# Patient Record
Sex: MALE | Race: ASIAN | NOT HISPANIC OR LATINO
[De-identification: names, ages, dates, MRNs, and addresses within clinical notes are randomized per-mention and may not be internally consistent; named-entity substitution may affect disease eponyms.]

---

## 2022-01-27 PROBLEM — Z00.00 ENCOUNTER FOR PREVENTIVE HEALTH EXAMINATION: Status: ACTIVE | Noted: 2022-01-27

## 2022-02-07 ENCOUNTER — APPOINTMENT (OUTPATIENT)
Dept: ORTHOPEDIC SURGERY | Facility: CLINIC | Age: 62
End: 2022-02-07
Payer: COMMERCIAL

## 2022-02-07 VITALS — HEIGHT: 67 IN | WEIGHT: 196 LBS | BODY MASS INDEX: 30.76 KG/M2

## 2022-02-07 PROCEDURE — 73030 X-RAY EXAM OF SHOULDER: CPT | Mod: RT

## 2022-02-07 PROCEDURE — 99204 OFFICE O/P NEW MOD 45 MIN: CPT | Mod: 25

## 2022-02-07 PROCEDURE — 20610 DRAIN/INJ JOINT/BURSA W/O US: CPT | Mod: RT

## 2022-02-07 RX ORDER — NABUMETONE 500 MG/1
500 TABLET, FILM COATED ORAL
Qty: 60 | Refills: 2 | Status: ACTIVE | COMMUNITY
Start: 2022-02-07 | End: 1900-01-01

## 2022-02-07 NOTE — HISTORY OF PRESENT ILLNESS
[de-identified] : Location: Right shoulder- lateral\par Duration: 4-5 weeks\par Context: atraumatic\par Quality: sharp, achy, dull\par Aggravating factors: ROM\par Associated symptoms: N/A\par Conservative treatment: Tylenol, ice\par Prior studies: N/A

## 2022-02-07 NOTE — PHYSICAL EXAM
[de-identified] : Right Shoulder:\par Constitutional:\par The patient is healthy-appearing and in no apparent distress. \par \par Cardiovascular System: \par The capillary refill is less than 2 seconds. \par \par Skin: \par There are no skin abnormalities.\par \par C-Spine/Neck:\par \par Active Range of Motion:\par Flexion				50\par Extension			60\par Lateral rotation			80  \par \par Right Shoulder: \par Inspection: \par There is no atrophy, erythema, warmth, swelling.\par There is no scapular winging.\par There is no AC prominence. \par \par Bony Palpation: \par There is no tenderness of the clavicle.\par There is no tenderness of the acromioclavicular joint.\par There is no tenderness of the greater tuberosity. \par There is no tenderness of the bicipital groove.\par  \par Soft Tissue Palpation: \par There is no tenderness of the trapezius.\par There is no tenderness of the rhomboid.\par There is no tenderness of the subacromial bursa. \par \par Active Range of Motion: \par Forward flexion- 				180 \par Abduction-					120\par External rotation at 0 degrees abduction-	80 \par Internal rotation at 0 degrees abduction-	80\par \par Passive Range of Motion: \par Forward flexion- 			180 \par Abduction-				120\par External rotation at 0 deg abduction-	80 \par Internal rotation at 0 deg abduction-	80\par \par Special Tests: \par Hawkin's  				Positive \par Neer's  				Positive \par Speed's  				Negative\par AC cross-over 			            Negative\par [place]'s  				Negative\par \par Stability: \par There is no general laxity. \par There is no anterior apprehension.\par \par Strength: \par Supraspinatus abduction 		5/5\par External rotation at 0 deg abduction 	5/5\par Internal rotation at 0 deg abduction	5/5\par Scapular elevation 			5/5 \par \par Neurological System: \par \par There is normal sensation to light touch C5-T1. \par \par Stability: \par There is no general laxity. \par \par Psychiatric: \par The patient demonstrates a normal mood and affect and is active and alert.\par  [de-identified] : Given patient's reported history and physical examination, x-ray evaluation ( as listed below ) was ordered and performed to aid in diagnosis and treatment of the patient.\par X-ray right shoulder.  There is no significant bony / soft tissue abnormality, arthritis, or fracture except type 2 acromion

## 2022-02-07 NOTE — PROCEDURE
[de-identified] : Patient has demonstrated limited relief from NSAIDS, rest, exercises / PT, and after discussion of the risks and benefits, the patient has elected to proceed with a corticosteroid injection into the RIGHT shoulder via Posterolateral site.\par Confirmed that the patient does not have history of prior adverse reactions, active, infections, or relevant allergies.   There was no erythema or warmth, and the skin was clear.  The skin was sterilized with alcohol and via sterile technique, the shoulder was injected with 3 cc of 1% xylocaine and 40 mg of Kenalog.  The injection was completed without complication and a bandage was applied.  The patient tolerated the procedure well and was given post-injection instructions.

## 2022-02-07 NOTE — ASSESSMENT
[FreeTextEntry1] : Discussed at length with patient exam history and imaging as well as treatment options.  Patient's time elects cortisone injection home exercises prescription anti-inflammatory and physical therapy.  If no improvement consideration to an MRI evaluation\par

## 2022-03-15 ENCOUNTER — APPOINTMENT (OUTPATIENT)
Dept: ORTHOPEDIC SURGERY | Facility: CLINIC | Age: 62
End: 2022-03-15
Payer: COMMERCIAL

## 2022-03-15 PROCEDURE — 20610 DRAIN/INJ JOINT/BURSA W/O US: CPT | Mod: RT

## 2022-03-15 PROCEDURE — 99213 OFFICE O/P EST LOW 20 MIN: CPT | Mod: 25

## 2022-03-16 NOTE — ASSESSMENT
[FreeTextEntry1] : Discussed at length with patient recurrence of symptoms at this time he elects repeat injection as well as MRI evaluation.

## 2022-03-16 NOTE — HISTORY OF PRESENT ILLNESS
[de-identified] : Patient is an established patient last seen back 5 weeks ago with right shoulder tendinitis.  He underwent a cortisone injection he states helped significantly and he is doing his home exercises are last 2 or 3 days some recurrence of discomfort\par \par

## 2022-03-16 NOTE — PHYSICAL EXAM
[de-identified] : Right Shoulder:\par Constitutional:\par The patient is healthy-appearing and in no apparent distress. \par \par Cardiovascular System: \par The capillary refill is less than 2 seconds. \par \par Skin: \par There are no skin abnormalities.\par \par C-Spine/Neck:\par \par Active Range of Motion:\par Flexion				50\par Extension			60\par Lateral rotation			80  \par \par Right Shoulder: \par Inspection: \par There is no atrophy, erythema, warmth, swelling.\par There is no scapular winging.\par There is no AC prominence. \par \par Bony Palpation: \par There is no tenderness of the clavicle.\par There is no tenderness of the acromioclavicular joint.\par There is no tenderness of the greater tuberosity. \par There is no tenderness of the bicipital groove.\par  \par Soft Tissue Palpation: \par There is no tenderness of the trapezius.\par There is no tenderness of the rhomboid.\par There is no tenderness of the subacromial bursa. \par \par Active Range of Motion: \par Forward flexion- 				180 \par Abduction-					120\par External rotation at 0 degrees abduction-	80 \par Internal rotation at 0 degrees abduction-	80\par \par Passive Range of Motion: \par Forward flexion- 			180 \par Abduction-				120\par External rotation at 0 deg abduction-	80 \par Internal rotation at 0 deg abduction-	80\par \par Special Tests: \par Hawkin's  				Positive \par Neer's  				Positive \par Speed's  				Negative\par AC cross-over 			            Negative\par [place]'s  				Negative\par \par Stability: \par There is no general laxity. \par There is no anterior apprehension.\par \par Strength: \par Supraspinatus abduction 		5/5\par External rotation at 0 deg abduction 	5/5\par Internal rotation at 0 deg abduction	5/5\par Scapular elevation 			5/5 \par \par Neurological System: \par \par There is normal sensation to light touch C5-T1. \par \par Stability: \par There is no general laxity. \par \par Psychiatric: \par The patient demonstrates a normal mood and affect and is active and alert.\par

## 2022-03-16 NOTE — PROCEDURE
[de-identified] : Patient has demonstrated limited relief from NSAIDS, rest, exercises / PT, and after discussion of the risks and benefits, the patient has elected to proceed with a corticosteroid injection into the RIGHT shoulder via Posterolateral site.\par Confirmed that the patient does not have history of prior adverse reactions, active, infections, or relevant allergies.   There was no erythema or warmth, and the skin was clear.  The skin was sterilized with alcohol and via sterile technique, the shoulder was injected with 3 cc of 1% xylocaine and 40 mg of Kenalog.  The injection was completed without complication and a bandage was applied.  The patient tolerated the procedure well and was given post-injection instructions.

## 2022-04-13 DIAGNOSIS — M25.511 PAIN IN RIGHT SHOULDER: ICD-10-CM

## 2022-04-18 ENCOUNTER — APPOINTMENT (OUTPATIENT)
Dept: ORTHOPEDIC SURGERY | Facility: CLINIC | Age: 62
End: 2022-04-18

## 2022-04-29 ENCOUNTER — APPOINTMENT (OUTPATIENT)
Age: 62
End: 2022-04-29
Payer: COMMERCIAL

## 2022-04-29 PROCEDURE — 29822 SHO ARTHRS SRG LMTD DBRDMT: CPT | Mod: RT,59

## 2022-04-29 PROCEDURE — 29826 SHO ARTHRS SRG DECOMPRESSION: CPT | Mod: RT,59

## 2022-04-29 PROCEDURE — 29828 SHO ARTHRS SRG BICP TENODSIS: CPT | Mod: RT,59

## 2022-04-29 PROCEDURE — 29827 SHO ARTHRS SRG RT8TR CUF RPR: CPT | Mod: RT

## 2022-04-29 PROCEDURE — 29820 SHO ARTHRS SRG PRTL SYNVCT: CPT | Mod: RT,59

## 2022-04-29 RX ORDER — OXYCODONE AND ACETAMINOPHEN 5; 325 MG/1; MG/1
5-325 TABLET ORAL
Qty: 40 | Refills: 0 | Status: ACTIVE | COMMUNITY
Start: 2022-04-29 | End: 1900-01-01

## 2022-05-03 ENCOUNTER — APPOINTMENT (OUTPATIENT)
Dept: ORTHOPEDIC SURGERY | Facility: CLINIC | Age: 62
End: 2022-05-03
Payer: COMMERCIAL

## 2022-05-03 PROCEDURE — 99024 POSTOP FOLLOW-UP VISIT: CPT

## 2022-05-03 NOTE — HISTORY OF PRESENT ILLNESS
[de-identified] : s/p RIGHT shoulder arthroscopy with RTC repair, biceps tenodesis, KORI, hassan syn. lab luna [de-identified] : Patient is presenting for first postop appointment he states origin rather well only taking Tylenol twice a day for pain control.  He denies any paresthesias.  She is maintaining the sling at all times [de-identified] : On exam of the right shoulder,  sling immobilizer is intact.  Wounds are healed with sutures which were removed and Steri-Stripped.  Normal sensation light touch C5-T1.  Full range of motion about the elbow.  Mild swelling consistent with expectation [de-identified] : s/p RIGHT shoulder arthroscopy with RTC repair, biceps tenodesis, KORI, hassan syn. lab luna [de-identified] : Discussed at length with patient surgery as well as postop expectations.  Sling for the next 3 weeks except for bathing and daily elbow range of motion exercises.  Followup in 3 weeks

## 2022-05-09 RX ORDER — CEPHALEXIN 500 MG/1
500 CAPSULE ORAL 3 TIMES DAILY
Qty: 30 | Refills: 0 | Status: ACTIVE | COMMUNITY
Start: 2022-05-09 | End: 1900-01-01

## 2022-05-24 ENCOUNTER — APPOINTMENT (OUTPATIENT)
Dept: ORTHOPEDIC SURGERY | Facility: CLINIC | Age: 62
End: 2022-05-24
Payer: COMMERCIAL

## 2022-05-24 PROCEDURE — 99024 POSTOP FOLLOW-UP VISIT: CPT

## 2022-05-24 NOTE — HISTORY OF PRESENT ILLNESS
[de-identified] : s/p RIGHT shoulder arthroscopy with RTC repair, biceps tenodesis, KORI, hassan syn. lab luna.  [de-identified] : Patient is now 3-1/2 weeks postop he states overall he is doing rather well.  He states he is not requiring anything for pain.  He states the anterior incision that had drained mild fluid closed within a day and has no symptoms since.     [de-identified] : On exam of the right shoulder, all wounds are healed with no tenderness throughout.  Postop sling was removed.  Full elbow range of motion.  Normal sensation light touch C5-T1.  5 out of 5 external and internal rotation strength.  Active range of motion of the shoulders forward flexion to 90°, external rotation to 50°, and internal rotation to 90.  Passive range of motion of the shoulder is forward flexion to 110°, external rotation to 50°, and internal rotation to 90. [de-identified] : s/p RIGHT shoulder arthroscopy with RTC repair, biceps tenodesis, KORI, hassan syn. lab luna.  [de-identified] : Discussed at length with patient postop protocol his pain he may discontinue sling and begin physical therapy in the beginning of next week.  Activity restrictions discussed he'll followup in 3 weeks

## 2022-06-14 ENCOUNTER — APPOINTMENT (OUTPATIENT)
Dept: ORTHOPEDIC SURGERY | Facility: CLINIC | Age: 62
End: 2022-06-14
Payer: COMMERCIAL

## 2022-06-14 DIAGNOSIS — M75.50 BURSITIS OF UNSPECIFIED SHOULDER: ICD-10-CM

## 2022-06-14 PROCEDURE — 99024 POSTOP FOLLOW-UP VISIT: CPT

## 2022-06-15 PROBLEM — M75.50 SHOULDER BURSITIS: Status: ACTIVE | Noted: 2022-02-07

## 2022-06-15 NOTE — HISTORY OF PRESENT ILLNESS
[de-identified] : s/p RIGHT shoulder arthroscopy with RTC repair, biceps tenodesis, KORI, hassan syn. lab luna.  [de-identified] : Patient is now 6.5 weeks postop he states overall he is doing rather well.  He states he is not requiring anything for pain.  He states PT and home exercises going well. [de-identified] : On exam of the right shoulder, all wounds are healed with no tenderness throughout.   Full elbow range of motion.  Normal sensation light touch C5-T1.  5 out of 5 external and internal rotation strength.  Active range of motion of the shoulders forward flexion to 120°, external rotation to 70°, and internal rotation to 90.  Passive range of motion of the shoulder is forward flexion to 140°, external rotation to 60°, and internal rotation to 90. [de-identified] : s/p RIGHT shoulder arthroscopy with RTC repair, biceps tenodesis, KORI, hassan syn. lab luna.  [de-identified] : Discussed at length with patient postop protocols.  Activity restrictions discussed he'll followup in 4 weeks

## 2022-07-27 ENCOUNTER — APPOINTMENT (OUTPATIENT)
Dept: ORTHOPEDIC SURGERY | Facility: CLINIC | Age: 62
End: 2022-07-27

## 2022-07-28 ENCOUNTER — APPOINTMENT (OUTPATIENT)
Dept: ORTHOPEDIC SURGERY | Facility: CLINIC | Age: 62
End: 2022-07-28

## 2022-07-28 PROCEDURE — 99024 POSTOP FOLLOW-UP VISIT: CPT

## 2022-07-28 NOTE — HISTORY OF PRESENT ILLNESS
[de-identified] : s/p RIGHT shoulder arthroscopy with RTC repair, biceps tenodesis, KORI, hassan syn. lab luna.  [de-identified] : Patient is now 12 weeks postop he states overall he is doing rather well.  He states he is not requiring anything for pain.  He states PT and home exercises going well. [de-identified] : On exam of the right shoulder, all wounds are healed with no tenderness throughout.   Full elbow range of motion.  Normal sensation light touch C5-T1.  5 out of 5 external and internal rotation strength, 4+/5 supraspinatus.  Active range of motion of the shoulders forward flexion to 170°, external rotation to 80°, and internal rotation to 90.  Passive range of motion of the shoulder is forward flexion to 170°, external rotation to 60°, and internal rotation to 90. [de-identified] : s/p RIGHT shoulder arthroscopy with RTC repair, biceps tenodesis, KORI, hassan syn. lab luna.  [de-identified] : Discussed at length with patient postop protocols.  Activity restrictions discussed.  Follow-up in 6 weeks.

## 2022-09-06 ENCOUNTER — APPOINTMENT (OUTPATIENT)
Dept: ORTHOPEDIC SURGERY | Facility: CLINIC | Age: 62
End: 2022-09-06

## 2022-09-06 DIAGNOSIS — M75.100 UNSPECIFIED ROTATOR CUFF TEAR OR RUPTURE OF UNSPECIFIED SHOULDER, NOT SPECIFIED AS TRAUMATIC: ICD-10-CM

## 2022-09-06 PROCEDURE — 99213 OFFICE O/P EST LOW 20 MIN: CPT

## 2022-09-06 NOTE — ASSESSMENT
[FreeTextEntry1] : Discussed at length with the patient overall clinical improvement and patient encouraged to continue home exercises and followup in 6 weeks.  He states he is traveling for the next month and if any concerns he will call the office

## 2022-09-06 NOTE — PHYSICAL EXAM
[de-identified] : Right Shoulder:\par Constitutional:\par The patient is healthy-appearing and in no apparent distress. \par \par Cardiovascular System: \par The capillary refill is less than 2 seconds. \par \par Skin: \par There are no skin abnormalities other than healed scars.\par \par Right Shoulder: \par Inspection: \par There is no atrophy, erythema, warmth, swelling.\par There is no scapular winging.\par There is no AC prominence. \par \par Bony Palpation: \par There is no tenderness of the clavicle.\par There is no tenderness of the acromioclavicular joint.\par There is no tenderness of the greater tuberosity. \par There is no tenderness of the bicipital groove.\par  \par Soft Tissue Palpation: \par There is no tenderness of the trapezius.\par There is no tenderness of the rhomboid.\par There is no tenderness of the subacromial bursa. \par \par Active Range of Motion: \par Forward flexion- 				170 \par Abduction-					120\par External rotation at 0 degrees abduction-	80 \par Internal rotation at 0 degrees abduction-	80\par \par Passive Range of Motion: \par Forward flexion- 			180 \par Abduction-				120\par External rotation at 0 deg abduction-	80 \par Internal rotation at 0 deg abduction-	80\par \par Stability: \par There is no general laxity. \par There is no anterior apprehension.\par \par Strength: \par Supraspinatus abduction 		5/5\par External rotation at 0 deg abduction 	5/5\par Internal rotation at 0 deg abduction	5/5\par Scapular elevation 			5/5 \par \par Neurological System: \par \par There is normal sensation to light touch C5-T1. \par \par Stability: \par There is no general laxity. \par \par Psychiatric: \par The patient demonstrates a normal mood and affect and is active and alert.

## 2022-09-06 NOTE — HISTORY OF PRESENT ILLNESS
[de-identified] : s/p RIGHT shoulder arthroscopy with RTC repair, biceps tenodesis, KORI, hassan syn. lab luna\par Patient states overall he is doing markedly better and he continues to do home exercise.  States not taking anything for pain